# Patient Record
Sex: MALE | Race: WHITE | NOT HISPANIC OR LATINO | Employment: OTHER | URBAN - METROPOLITAN AREA
[De-identification: names, ages, dates, MRNs, and addresses within clinical notes are randomized per-mention and may not be internally consistent; named-entity substitution may affect disease eponyms.]

---

## 2024-09-26 ENCOUNTER — HOSPITAL ENCOUNTER (EMERGENCY)
Facility: CLINIC | Age: 84
Discharge: HOME | End: 2024-09-27
Attending: EMERGENCY MEDICINE | Admitting: EMERGENCY MEDICINE
Payer: COMMERCIAL

## 2024-09-26 DIAGNOSIS — T18.108A FOREIGN BODY IN ESOPHAGUS, INITIAL ENCOUNTER: ICD-10-CM

## 2024-09-26 PROCEDURE — 250N000013 HC RX MED GY IP 250 OP 250 PS 637: Performed by: EMERGENCY MEDICINE

## 2024-09-26 PROCEDURE — 96374 THER/PROPH/DIAG INJ IV PUSH: CPT

## 2024-09-26 PROCEDURE — 99284 EMERGENCY DEPT VISIT MOD MDM: CPT | Mod: 25

## 2024-09-26 PROCEDURE — 96375 TX/PRO/DX INJ NEW DRUG ADDON: CPT

## 2024-09-26 PROCEDURE — 43247 EGD REMOVE FOREIGN BODY: CPT | Performed by: INTERNAL MEDICINE

## 2024-09-26 PROCEDURE — 250N000011 HC RX IP 250 OP 636: Performed by: EMERGENCY MEDICINE

## 2024-09-26 RX ORDER — AZELASTINE HYDROCHLORIDE, FLUTICASONE PROPIONATE 137; 50 UG/1; UG/1
1 SPRAY, METERED NASAL 2 TIMES DAILY
COMMUNITY

## 2024-09-26 RX ORDER — ASPIRIN 81 MG/1
81 TABLET ORAL DAILY
COMMUNITY

## 2024-09-26 RX ORDER — NALOXONE HYDROCHLORIDE 0.4 MG/ML
0.4 INJECTION, SOLUTION INTRAMUSCULAR; INTRAVENOUS; SUBCUTANEOUS
Status: CANCELLED | OUTPATIENT
Start: 2024-09-26

## 2024-09-26 RX ORDER — FLUMAZENIL 0.1 MG/ML
0.2 INJECTION, SOLUTION INTRAVENOUS
Status: CANCELLED | OUTPATIENT
Start: 2024-09-26 | End: 2024-09-27

## 2024-09-26 RX ORDER — NALOXONE HYDROCHLORIDE 0.4 MG/ML
0.2 INJECTION, SOLUTION INTRAMUSCULAR; INTRAVENOUS; SUBCUTANEOUS
Status: CANCELLED | OUTPATIENT
Start: 2024-09-26

## 2024-09-26 RX ORDER — PROCHLORPERAZINE MALEATE 5 MG
5 TABLET ORAL EVERY 6 HOURS PRN
Status: CANCELLED | OUTPATIENT
Start: 2024-09-26

## 2024-09-26 RX ORDER — NITROGLYCERIN 0.4 MG/1
TABLET SUBLINGUAL
Status: COMPLETED
Start: 2024-09-26 | End: 2024-09-26

## 2024-09-26 RX ORDER — NITROGLYCERIN 0.4 MG/1
0.4 TABLET SUBLINGUAL EVERY 5 MIN PRN
COMMUNITY

## 2024-09-26 RX ORDER — LISINOPRIL 40 MG/1
40 TABLET ORAL DAILY
COMMUNITY

## 2024-09-26 RX ORDER — NITROGLYCERIN 0.4 MG/1
0.4 TABLET SUBLINGUAL ONCE
Status: DISCONTINUED | OUTPATIENT
Start: 2024-09-26 | End: 2024-09-27 | Stop reason: HOSPADM

## 2024-09-26 RX ORDER — FENTANYL CITRATE 50 UG/ML
INJECTION, SOLUTION INTRAMUSCULAR; INTRAVENOUS
Status: COMPLETED
Start: 2024-09-26 | End: 2024-09-26

## 2024-09-26 RX ORDER — LIDOCAINE 40 MG/G
CREAM TOPICAL
Status: DISCONTINUED | OUTPATIENT
Start: 2024-09-26 | End: 2024-09-27 | Stop reason: HOSPADM

## 2024-09-26 RX ORDER — ONDANSETRON 2 MG/ML
4 INJECTION INTRAMUSCULAR; INTRAVENOUS EVERY 6 HOURS PRN
Status: CANCELLED | OUTPATIENT
Start: 2024-09-26

## 2024-09-26 RX ORDER — ALLOPURINOL 100 MG/1
100 TABLET ORAL DAILY
COMMUNITY

## 2024-09-26 RX ORDER — OMEPRAZOLE 40 MG/1
40 CAPSULE, DELAYED RELEASE ORAL
Qty: 60 CAPSULE | Refills: 3 | Status: CANCELLED | OUTPATIENT
Start: 2024-09-26

## 2024-09-26 RX ORDER — ONDANSETRON 4 MG/1
4 TABLET, ORALLY DISINTEGRATING ORAL EVERY 6 HOURS PRN
Status: CANCELLED | OUTPATIENT
Start: 2024-09-26

## 2024-09-26 RX ORDER — ATORVASTATIN CALCIUM 40 MG/1
40 TABLET, FILM COATED ORAL DAILY
COMMUNITY

## 2024-09-26 RX ORDER — FENTANYL CITRATE 50 UG/ML
100 INJECTION, SOLUTION INTRAMUSCULAR; INTRAVENOUS
Status: COMPLETED | OUTPATIENT
Start: 2024-09-26 | End: 2024-09-26

## 2024-09-26 RX ADMIN — NITROGLYCERIN 0.4 MG: 0.4 TABLET SUBLINGUAL at 21:39

## 2024-09-26 RX ADMIN — MIDAZOLAM 2 MG: 1 INJECTION INTRAMUSCULAR; INTRAVENOUS at 23:13

## 2024-09-26 RX ADMIN — FENTANYL CITRATE 100 MCG: 50 INJECTION, SOLUTION INTRAMUSCULAR; INTRAVENOUS at 23:12

## 2024-09-26 RX ADMIN — MIDAZOLAM 1 MG: 1 INJECTION INTRAMUSCULAR; INTRAVENOUS at 23:14

## 2024-09-26 ASSESSMENT — COLUMBIA-SUICIDE SEVERITY RATING SCALE - C-SSRS
6. HAVE YOU EVER DONE ANYTHING, STARTED TO DO ANYTHING, OR PREPARED TO DO ANYTHING TO END YOUR LIFE?: NO
1. IN THE PAST MONTH, HAVE YOU WISHED YOU WERE DEAD OR WISHED YOU COULD GO TO SLEEP AND NOT WAKE UP?: NO
2. HAVE YOU ACTUALLY HAD ANY THOUGHTS OF KILLING YOURSELF IN THE PAST MONTH?: NO

## 2024-09-26 ASSESSMENT — ACTIVITIES OF DAILY LIVING (ADL)
ADLS_ACUITY_SCORE: 35

## 2024-09-27 VITALS
HEIGHT: 66 IN | BODY MASS INDEX: 29.55 KG/M2 | OXYGEN SATURATION: 97 % | WEIGHT: 183.86 LBS | TEMPERATURE: 97.1 F | RESPIRATION RATE: 17 BRPM | SYSTOLIC BLOOD PRESSURE: 161 MMHG | HEART RATE: 73 BPM | DIASTOLIC BLOOD PRESSURE: 87 MMHG

## 2024-09-27 LAB — UPPER GI ENDOSCOPY: NORMAL

## 2024-09-27 NOTE — ED TRIAGE NOTES
Pt reports esophageal stricture and reports at 1820 got a piece of pork tenderloin stuck. Pt spitting out his secretions

## 2024-09-27 NOTE — DISCHARGE INSTRUCTIONS
Be sure to chew your food thoroughly before swallowing.    Follow-up with [facility] to discuss neck steps.  They should have sent a prescription for antacid medication to the pharmacy for you.    Return to the ER if you develop any new or troubling symptoms.    The patient has received a copy of the Provation Report the doctor has written and was discussed with the patient and given to primary RN prior to returning to the inpatient floor.  All questions were answered and patient has provider's office phone number for further questions or concerns.     Discharge Instructions  Procedural Sedation    During your visit today you had Procedural Sedation which is the process used to give you medications in order to make you comfortable or relaxed while a painful or difficult procedure is performed. This might have been a wound repair, fracture reduction ( setting a broken bone ), relocation of a dislocated joint, or other procedure.    The medications used for Procedural Sedation are generally very short-acting so you are being discharged at a time when it is most likely that the medications have completely left your body. It is possible that the medication could cause some persistent symptoms like nausea, drowsiness, dizziness, clumsiness/poor balance, or poor judgment. As a result, please do not drive, operate machinery/tools, or do anything else that requires judgment or coordination for the rest of the day. This could include climbing ladders or other heights, swimming/bathing, exercising, bicycling, or other similar activities. If you are feeling back to normal, you may resume normal activities the following day.    Generally, every Emergency Department visit should have a follow-up clinic visit with either a primary or a specialty clinic/provider. Please follow-up as instructed by your emergency provider today.  Return to the Emergency Department right away if:  You have difficulty breathing.  Your friends or  family feel that you are too sleepy/sedated.  You have repeated vomiting.    Home care:  Do not drink alcohol or take sedating (sleeping) medications.  Take pain medications only as instructed by your provider.  Begin with a light diet (like clear liquids) and add more foods as your stomach tolerates.  If you have vomiting, return to clear liquids.    Follow-up:  Follow-up was recommended based on the condition that you received Procedural Sedation for; follow-up according to the instructions you received from your provider today.      If you were given a prescription for medicine here today, be sure to read all of the information (including the package insert) that comes with your prescription. This will include important information about the medicine, its side effects, and any warnings that you need to know about. The pharmacist who fills the prescription can provide more information and answer questions you may have about the medicine.  If you have questions or concerns that the pharmacist cannot address, please call or return to the Emergency Department.     Remember that you can always come back to the Emergency Department if you are not able to see your regular provider in the amount of time listed above, if you get any new symptoms, or if there is anything that worries you.

## 2024-09-27 NOTE — ED NOTES
Patient tolerated water. Tolerated 7up. Able to walk around room without nausea and dizziness.   Verbalized understanding of discharge instructions.

## 2024-09-27 NOTE — ED PROVIDER NOTES
Emergency Department Note      History of Present Illness     Chief Complaint   Esophageal foreign body    HPI   Zane Coleman is a 83 year old male who presents to the ED with his wife for evaluation of an esophageal foreign body.  (Note made of  listing airway obstruction, which is inaccurate).  Patient reports a history of known esophageal stricture, diagnosed on EGD about 10 years previous.  Lately, he has noted increased feelings of food being stuck as he swallows, though has not had complete obstruction.  Earlier this evening, he was eating dinner when a piece of pork lodged in his mid to lower esophagus.  He has tried gargling warm water and pounding on his chest, which has not alleviated his obstruction.  He denies any other food products that may have gotten stuck, including sharp objects or bones.  He presents to the ED for further assessment.    Independent Historian   Wife as detailed above.    Review of External Notes   I reviewed an urgency room note from 08/31 where he was seen for a knee effusion and right leg wound.    Past Medical History     Medical History and Problem List   Colon polyps   CAD   Dysphagia   Gout   Hypertension   Hyperlipidemia   Internal hemorrhoids   Malig Neopl Skin Trunk   Osteoarthritis   Obstructive sleep apnea   Type 2 diabetes     Medications   No current outpatient medications on file.    Surgical History   Total hip arthroplasty   Patellectomy   Arthroplasty, bilateral knees  Septoplasty   Hip replacement   Colonoscopy x2  EGD surgery     Physical Exam     Patient Vitals for the past 24 hrs:   BP Temp Temp src Pulse Resp SpO2 Height Weight   09/26/24 2345 126/72 -- -- 74 14 95 % -- --   09/26/24 2340 123/71 -- -- 77 17 97 % -- --   09/26/24 2335 126/70 -- -- 77 15 95 % -- --   09/26/24 2330 (!) 140/75 -- -- 80 16 95 % -- --   09/26/24 2325 (!) 147/79 -- -- 81 16 98 % -- --   09/26/24 2320 (!) 178/94 -- -- 84 14 93 % -- --   09/26/24 2315 (!) 147/82 -- -- 90 15 97 %  "-- --   09/26/24 2310 (!) 178/94 -- -- 91 12 99 % -- --   09/26/24 2305 (!) 183/106 -- -- 88 11 97 % -- --   09/26/24 2301 (!) 182/93 -- -- 93 15 94 % -- --   09/26/24 2215 (!) 178/87 -- -- 88 -- -- -- --   09/26/24 2200 (!) 171/86 -- -- 93 -- -- -- --   09/26/24 2155 (!) 155/82 -- -- 96 -- -- -- --   09/26/24 2150 (!) 167/84 -- -- 93 -- -- -- --   09/26/24 2145 (!) 172/116 -- -- 99 -- -- -- --   09/26/24 2140 (!) 180/96 -- -- 87 -- 100 % -- --   09/26/24 2138 (!) 189/95 -- -- 92 -- -- -- --   09/26/24 2100 (!) 141/72 -- -- 85 -- 98 % -- --   09/26/24 2030 (!) 152/78 -- -- 86 -- 98 % -- --   09/26/24 2029 -- -- -- -- -- 99 % -- --   09/26/24 2027 (!) 167/79 -- -- 94 -- -- -- --   09/26/24 2021 (!) 174/111 97.1  F (36.2  C) Temporal 67 18 99 % 1.676 m (5' 6\") 83.4 kg (183 lb 13.8 oz)     Physical Exam  General:              Well-nourished              Speaking in full sentences              Intermittently spitting out saliva  Eyes:              Conjunctiva without injection or scleral icterus  ENT:              Moist mucous membranes              Nares patent              Pinnae normal  Neck:              Full ROM              No stiffness appreciated              No stridor  Resp:              Lungs CTAB              No crackles, wheezing or audible rubs              Good air movement  CV:                    Normal rate, regular rhythm              S1 and S2 present              No murmur, gallop or rub  GI:              BS present              Abdomen soft without distention              Non-tender to light and deep palpation              No guarding or rebound tenderness  Skin:              Warm, dry, well perfused              No rashes or open wounds on exposed skin  MSK:              Moves all extremities              No focal deformities or swelling  Neuro:              Alert              Answers questions appropriately              Moves all extremities equally              Gait stable  Psych:              " Normal affect, normal mood    Diagnostics     Lab Results   Labs Ordered and Resulted from Time of ED Arrival to Time of ED Departure - No data to display    Imaging   No orders to display     Independent Interpretation   None    ED Course      Medications Administered   Medications   nitroGLYcerin (NITROSTAT) sublingual tablet 0.4 mg ( Sublingual Canceled Entry 9/26/24 2140)   sodium chloride 0.9% BOLUS 500 mL (has no administration in time range)   lidocaine 1 % 0.1-1 mL (has no administration in time range)   lidocaine (LMX4) cream (has no administration in time range)   sodium chloride (PF) 0.9% PF flush 3 mL (has no administration in time range)   sodium chloride (PF) 0.9% PF flush 3 mL (has no administration in time range)   nitroGLYcerin (NITROSTAT) 0.4 MG sublingual tablet (0.4 mg  $Given 9/26/24 2139)   fentaNYL (PF) (SUBLIMAZE) injection 100 mcg (100 mcg Intravenous $Given 9/26/24 2312)   midazolam (VERSED) injection 2 mg (1 mg Intravenous $Given 9/26/24 2314)       Procedures   Procedures     Discussion of Management   Gastroenterology, Dr. Tang    ED Course   ED Course as of 09/26/24 2354   Thu Sep 26, 2024   2047 I obtained history and examined the patient as noted above.    2133 I rechecked and updated the patient.    2153 I spoke to Dr. Tang from [facility] regarding the patient's current status.   2206 I rechecked and updated the patient.    2345 Patient re-evaluated     Additional Documentation  None    Medical Decision Making / Diagnosis     CMS Diagnoses: None    MIPS       None    MDM   Zane Coleman is a 83 year old male presenting to the ER for evaluation of an esophageal foreign body.  VS on presentation reveal elevated BP, which improved on recheck.  History consistent with esophageal food bolus.  We initially attempted EZ gas and sublingual nitroglycerin, though without any alleviation in symptoms.  GI consulted and have graciously evaluated patient in the ED and performed EGD.  They have  noted findings of Schatzki's ring, and were able to pass the food bolus.  They have sent a prescription for a PPI to the pharmacy.  Patient was allowed to awaken from sedation and will be ambulated in the ED.  Will plan discharge home and with close GI follow-up as they do anticipate need for dilation of the esophageal stricture in the coming weeks.  Plan of care discussed with family.  Return to ER with any new or troubling symptoms.  Ensure adequate chewing of food prior to swallowing.  Questions answered prior to discharge.    Disposition   The patient was discharged.     Diagnosis     ICD-10-CM    1. Foreign body in esophagus, initial encounter  T18.108A              Laura GRAY, coral serving as a scribe at 8:45 PM on 9/26/2024 to document services personally performed by Cam Barrow MD based on my observations and the provider's statements to me.        Cam Barrow MD  09/26/24 7101

## 2024-10-24 ENCOUNTER — APPOINTMENT (OUTPATIENT)
Dept: GENERAL RADIOLOGY | Facility: CLINIC | Age: 84
End: 2024-10-24
Attending: STUDENT IN AN ORGANIZED HEALTH CARE EDUCATION/TRAINING PROGRAM
Payer: COMMERCIAL

## 2024-10-24 ENCOUNTER — HOSPITAL ENCOUNTER (EMERGENCY)
Facility: CLINIC | Age: 84
Discharge: HOME | End: 2024-10-24
Attending: STUDENT IN AN ORGANIZED HEALTH CARE EDUCATION/TRAINING PROGRAM | Admitting: STUDENT IN AN ORGANIZED HEALTH CARE EDUCATION/TRAINING PROGRAM
Payer: COMMERCIAL

## 2024-10-24 VITALS
DIASTOLIC BLOOD PRESSURE: 80 MMHG | RESPIRATION RATE: 20 BRPM | TEMPERATURE: 97.5 F | SYSTOLIC BLOOD PRESSURE: 158 MMHG | HEART RATE: 92 BPM | HEIGHT: 66 IN | WEIGHT: 182.5 LBS | OXYGEN SATURATION: 99 % | BODY MASS INDEX: 29.33 KG/M2

## 2024-10-24 DIAGNOSIS — M25.432 WRIST SWELLING, LEFT: ICD-10-CM

## 2024-10-24 LAB
ALBUMIN SERPL BCG-MCNC: 3.7 G/DL (ref 3.5–5.2)
ALP SERPL-CCNC: 108 U/L (ref 40–150)
ALT SERPL W P-5'-P-CCNC: 18 U/L (ref 0–70)
ANION GAP SERPL CALCULATED.3IONS-SCNC: 14 MMOL/L (ref 7–15)
AST SERPL W P-5'-P-CCNC: 29 U/L (ref 0–45)
BASOPHILS # BLD AUTO: 0 10E3/UL (ref 0–0.2)
BASOPHILS NFR BLD AUTO: 0 %
BILIRUB SERPL-MCNC: 0.8 MG/DL
BUN SERPL-MCNC: 16.6 MG/DL (ref 8–23)
CALCIUM SERPL-MCNC: 9.1 MG/DL (ref 8.8–10.4)
CHLORIDE SERPL-SCNC: 103 MMOL/L (ref 98–107)
CREAT SERPL-MCNC: 0.94 MG/DL (ref 0.67–1.17)
CRP SERPL-MCNC: 10.72 MG/L
EGFRCR SERPLBLD CKD-EPI 2021: 80 ML/MIN/1.73M2
EOSINOPHIL # BLD AUTO: 0.1 10E3/UL (ref 0–0.7)
EOSINOPHIL NFR BLD AUTO: 1 %
ERYTHROCYTE [DISTWIDTH] IN BLOOD BY AUTOMATED COUNT: 13.1 % (ref 10–15)
ERYTHROCYTE [SEDIMENTATION RATE] IN BLOOD BY WESTERGREN METHOD: 27 MM/HR (ref 0–20)
GLUCOSE SERPL-MCNC: 126 MG/DL (ref 70–99)
HCO3 SERPL-SCNC: 21 MMOL/L (ref 22–29)
HCT VFR BLD AUTO: 40.5 % (ref 40–53)
HGB BLD-MCNC: 13.7 G/DL (ref 13.3–17.7)
HOLD SPECIMEN: NORMAL
HOLD SPECIMEN: NORMAL
IMM GRANULOCYTES # BLD: 0 10E3/UL
IMM GRANULOCYTES NFR BLD: 0 %
LYMPHOCYTES # BLD AUTO: 1.6 10E3/UL (ref 0.8–5.3)
LYMPHOCYTES NFR BLD AUTO: 22 %
MCH RBC QN AUTO: 30.7 PG (ref 26.5–33)
MCHC RBC AUTO-ENTMCNC: 33.8 G/DL (ref 31.5–36.5)
MCV RBC AUTO: 91 FL (ref 78–100)
MONOCYTES # BLD AUTO: 0.6 10E3/UL (ref 0–1.3)
MONOCYTES NFR BLD AUTO: 8 %
NEUTROPHILS # BLD AUTO: 5 10E3/UL (ref 1.6–8.3)
NEUTROPHILS NFR BLD AUTO: 68 %
NRBC # BLD AUTO: 0 10E3/UL
NRBC BLD AUTO-RTO: 0 /100
PLATELET # BLD AUTO: 191 10E3/UL (ref 150–450)
POTASSIUM SERPL-SCNC: 4.6 MMOL/L (ref 3.4–5.3)
PROT SERPL-MCNC: 6.8 G/DL (ref 6.4–8.3)
RBC # BLD AUTO: 4.46 10E6/UL (ref 4.4–5.9)
SODIUM SERPL-SCNC: 138 MMOL/L (ref 135–145)
WBC # BLD AUTO: 7.4 10E3/UL (ref 4–11)

## 2024-10-24 PROCEDURE — 85652 RBC SED RATE AUTOMATED: CPT | Performed by: STUDENT IN AN ORGANIZED HEALTH CARE EDUCATION/TRAINING PROGRAM

## 2024-10-24 PROCEDURE — 85025 COMPLETE CBC W/AUTO DIFF WBC: CPT | Performed by: STUDENT IN AN ORGANIZED HEALTH CARE EDUCATION/TRAINING PROGRAM

## 2024-10-24 PROCEDURE — 87205 SMEAR GRAM STAIN: CPT | Performed by: STUDENT IN AN ORGANIZED HEALTH CARE EDUCATION/TRAINING PROGRAM

## 2024-10-24 PROCEDURE — 36415 COLL VENOUS BLD VENIPUNCTURE: CPT | Performed by: STUDENT IN AN ORGANIZED HEALTH CARE EDUCATION/TRAINING PROGRAM

## 2024-10-24 PROCEDURE — 86140 C-REACTIVE PROTEIN: CPT | Performed by: STUDENT IN AN ORGANIZED HEALTH CARE EDUCATION/TRAINING PROGRAM

## 2024-10-24 PROCEDURE — 20605 DRAIN/INJ JOINT/BURSA W/O US: CPT

## 2024-10-24 PROCEDURE — 73130 X-RAY EXAM OF HAND: CPT | Mod: LT

## 2024-10-24 PROCEDURE — 99284 EMERGENCY DEPT VISIT MOD MDM: CPT | Mod: 25

## 2024-10-24 PROCEDURE — 80053 COMPREHEN METABOLIC PANEL: CPT | Performed by: STUDENT IN AN ORGANIZED HEALTH CARE EDUCATION/TRAINING PROGRAM

## 2024-10-24 PROCEDURE — 73110 X-RAY EXAM OF WRIST: CPT | Mod: LT

## 2024-10-24 RX ORDER — CEPHALEXIN 500 MG/1
500 CAPSULE ORAL 4 TIMES DAILY
Qty: 28 CAPSULE | Refills: 0 | Status: SHIPPED | OUTPATIENT
Start: 2024-10-24 | End: 2024-10-31

## 2024-10-24 ASSESSMENT — COLUMBIA-SUICIDE SEVERITY RATING SCALE - C-SSRS
2. HAVE YOU ACTUALLY HAD ANY THOUGHTS OF KILLING YOURSELF IN THE PAST MONTH?: NO
1. IN THE PAST MONTH, HAVE YOU WISHED YOU WERE DEAD OR WISHED YOU COULD GO TO SLEEP AND NOT WAKE UP?: NO
6. HAVE YOU EVER DONE ANYTHING, STARTED TO DO ANYTHING, OR PREPARED TO DO ANYTHING TO END YOUR LIFE?: NO

## 2024-10-24 ASSESSMENT — ACTIVITIES OF DAILY LIVING (ADL)
ADLS_ACUITY_SCORE: 0
ADLS_ACUITY_SCORE: 0

## 2024-10-24 NOTE — ED PROVIDER NOTES
"ED APC SUPERVISION NOTE:   I evaluated this patient in conjunction with *** {NP/PA:442265}  I have participated in the care of the patient and personally performed key elements of the history, exam, and medical decision making.      HPI:   Zane Coleman is a 83 year old male ***      Independent Historian:   {SASHA Independent Historian:581332::\"None\"}    Review of External Notes: ***      EXAM:   ***    Independent Interpretation (X-rays, CTs, rhythm strip):  {IndependentReview:929862::\"None\"}    Consultations/Discussion of Management or Tests:  {Consults/Care Discussions:220392::\"None\"}     MEDICAL DECISION MAKING/ASSESSMENT AND PLAN:   ***      DIAGNOSIS:   No diagnosis found.      {Provider or scribe signature:[id number]}  10/24/2024  [facility] EMERGENCY DEPT  "

## 2024-10-24 NOTE — ED PROVIDER NOTES
ED APC SUPERVISION NOTE:   I evaluated this patient in conjunction with Mary Cruz PA-C  I have participated in the care of the patient and personally performed key elements of the history, exam, and medical decision making.      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  He presents with swelling and pain of the left wrist.  He does do quite a bit of work with his hands and has been doing work lately with the potential strain.  However, he is developed some erythema of the overlying skin.  No fevers, chills, or nausea.  He feels that the pain is different from his typical gout symptoms.  He does have a history of diabetes and is on metformin but says his blood sugars are generally well-controlled.  He has never had a septic arthritis in the past.    Review of External Notes:   Orthopedic notes reviewed from September 26, 2016 and the patient was noted to have osteoarthritis.  At that point he had reverse total shoulder arthroplasty on the left.     EXAM:   General: No distress  Musculoskeletal: There is an effusion of the left wrist.  There is erythema of the dorsum of the wrist.  Full active range of motion.  There is mild pain elicited with passive range of motion.  There is ecchymosis over the dorsum of the left hand especially overlying the fourth and fifth metacarpals.  There is a chronic deformity of the right index left index finger.  No erythema or tenderness of the finger.    Independent Interpretation (X-rays, CTs, rhythm strip):  X-ray left wrist independently interpreted.  No fracture.       MEDICAL  DECISION MAKING/ASSESSMENT AND PLAN:   This patient presents with erythema over the dorsum of the left wrist and hand.  Broad differential diagnosis is considered including gout, pseudogout, trauma, arthritis, cellulitis.  There are some ecchymosis although the patient feels that there was no direct injury.  This could be an effusion related to straining of the wrist.  Gout is certainly a possibility given the patient's history although he feels that this pain seems different and he is never had in the wrist before.  He is diabetic and we considered the possibility of an infection.  The patient was offered an arthrocentesis which he feels he would prefer at this point.    Arthrocentesis was successful.  There is minimal fluid present and lab follow-up there was no enough for culture which is pending.  It is still possible this is gout and the patient will follow closely with orthopedics.  Given the possibility of cellulitis overlying the joint we will cover him with Keflex.  The fluid was clear and at this point septic arthritis seems unlikely but I stated we will follow culture results.     DIAGNOSIS:     ICD-10-CM    1. Wrist swelling, left  M25.432            Oh Doyle MD  10/24/24 2159

## 2024-10-24 NOTE — ED TRIAGE NOTES
Pt here for L hand/wrist pain, redness, and swelling. Hx of prosthetic joint in pointer finger. Pain is worse at night. Sent here from  for further workup.

## 2024-10-24 NOTE — ED PROVIDER NOTES
"  Emergency Department Note      History of Present Illness     Chief Complaint   Hand Pain    HPI   Zane Coleman is a 83 year old male with history including osteoarthritis, prosthetic finger joints, and type 2 diabetes mellitus who presents to the ED for evaluation of hand pain. The patient reports having difficulties sleeping 2 night ago after waking up with left wrist pain. The pain was gone in the morning, but returned last night. He took advil, which helped to alleviate the pain. Today, he's had increased pain, redness, and swelling in his left wrist extending into his hand. The pain and swelling makes it difficult to use his hand. The pain doesn't feel like gout. He has never had an infection in his hand before. He notes having a right lower extremity infection at the end of August after scraping his shin on a wooden pallet. He was prescribed keflex and his symptoms resolved.     Independent Historian   None    Review of External Notes   Reviewed urgent care note from earlier today    Past Medical History     Medical History and Problem List   Colon polyps   CAD   Dysphagia   Gout   Hypertension   Hyperlipidemia   Internal hemorrhoids   Malig Neopl Skin Trunk   Osteoarthritis   Obstructive sleep apnea   Type 2 diabetes     Medications   Allopurinol  Aspirin 81 mg  Atorvastatin  Lisinopril  Metformin  Nitroglycerin    Surgical History   EGD    Physical Exam     Patient Vitals for the past 24 hrs:   BP Temp Temp src Pulse Resp SpO2 Height Weight   10/24/24 1610 (!) 158/80 -- -- -- -- -- -- --   10/24/24 1609 -- 97.5  F (36.4  C) Temporal 92 20 99 % 1.676 m (5' 6\") 82.8 kg (182 lb 8 oz)     Physical Exam  General: Alert and cooperative with exam. Patient in no apparent distress. Normal mentation.  Head:  Scalp is NC/AT  Eyes:  EOM intact  ENT:  The external nose and ears are normal.   Neck:  Normal range of motion without rigidity.  CV:  Warm and well perfused  Resp:  Breathing comfortably on room air  MSK: "   Effusion noted to left wrist.  Warmth, erythema, and tenderness over the dorsal aspect of the left wrist.  Ecchymosis over dorsal aspect of fifth and fourth metacarpals with mild tenderness to palpation.  No obvious deformity present in this area.  Chronic deformity noted to digits.  No erythema, ecchymosis, or swelling noted to digits.  Normal range of motion of the wrist however this does elicit some pain.  Skin:  Warm and dry, No rash or lesions noted.  Neuro:  Oriented x 3. No gross motor deficits.      Diagnostics     Lab Results   Labs Ordered and Resulted from Time of ED Arrival to Time of ED Departure   COMPREHENSIVE METABOLIC PANEL - Abnormal       Result Value    Sodium 138      Potassium 4.6      Carbon Dioxide (CO2) 21 (*)     Anion Gap 14      Urea Nitrogen 16.6      Creatinine 0.94      GFR Estimate 80      Calcium 9.1      Chloride 103      Glucose 126 (*)     Alkaline Phosphatase 108      AST 29      ALT 18      Protein Total 6.8      Albumin 3.7      Bilirubin Total 0.8     CRP INFLAMMATION - Abnormal    CRP Inflammation 10.72 (*)    CBC WITH PLATELETS AND DIFFERENTIAL    WBC Count 7.4      RBC Count 4.46      Hemoglobin 13.7      Hematocrit 40.5      MCV 91      MCH 30.7      MCHC 33.8      RDW 13.1      Platelet Count 191      % Neutrophils 68      % Lymphocytes 22      % Monocytes 8      % Eosinophils 1      % Basophils 0      % Immature Granulocytes 0      NRBCs per 100 WBC 0      Absolute Neutrophils 5.0      Absolute Lymphocytes 1.6      Absolute Monocytes 0.6      Absolute Eosinophils 0.1      Absolute Basophils 0.0      Absolute Immature Granulocytes 0.0      Absolute NRBCs 0.0     ERYTHROCYTE SEDIMENTATION RATE AUTO   CRYSTAL ID SYNOVIAL FLUID   CELL COUNT BODY FLUID   DIFERENTIAL BODY FLUID   AEROBIC BACTERIAL CULTURE ROUTINE   CELL COUNT WITH DIFFERENTIAL FLUID     Imaging   XR Wrist Left G/E 3 Views   Final Result   IMPRESSION:       No acute, displaced left wrist or hand fracture or  dislocation. There are postoperative changes from index finger PIP joint arthroplasty. There is osseous fusion across the second and third and fifth DIP joints.      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 There is arthropathy involving the MCP and interphalangeal joints with joint space narrowing and marginal osteophyte formation most pronounced at the third PIP    joint where it appears advanced.      XR Hand Left G/E 3 Views   Final Result   IMPRESSION:       No acute, displaced left wrist or hand fracture or dislocation. There are postoperative changes from index finger PIP joint arthroplasty. There is osseous fusion across the second and third and fifth DIP joints.      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 There is arthropathy involving the MCP and interphalangeal joints with joint space narrowing and marginal osteophyte formation most pronounced at the third PIP    joint where it appears advanced.          Independent Interpretation   X-rays of the left wrist and left hand without any evidence of acute fracture or dislocation    ED Course      Medications Administered   Medications   lidocaine 1 % 5 mL (has no administration in time range)       Procedures   See Zane Diamond PA-C's separate procedural note    Discussion of Management   See ED Course below    ED Course   ED Course as of 10/24/24 1954   Thu Oct 24, 2024   1732 I obtained history and examined the patient as noted above.    1848 I spoke with Oh Doyle MD.        Additional Documentation  None    Medical Decision Making / Diagnosis     CMS Diagnoses: None    MIPS       None    [facility]   Zane MARLIN Jared is a 83 year old male presents with atraumatic left wrist and hand pain.  History of gout however states that symptoms are not consistent with previous gout flares.  On exam, swelling, warmth, and erythema noted to dorsal aspect of left  wrist with ecchymosis also noted over fifth metacarpal.  He has full range of motion of the wrist without difficulty but does endorse mild pain with this.  Normal range of motion of the digits with no tenderness to palpation throughout.  X-rays were obtained of the hand and wrist and are without any evidence of fracture, dislocation, or signs of osteomyelitis.  Due to concern for possible septic joint vs gout, arthrocentesis was completed at the bedside by my colleague, Zane Diamond PA-C.  See his separate procedural note.  Patient tolerated this well.  Will send synovial fluid for culture as well as further testing.  In the meantime, treat for presumed cellulitis with course of Keflex.  His labs here are reassuring however his CRP was noted to be mildly elevated at 10.72.  If synovial fluid testing returns with indications for treatment change, patient will be called to be informed of this.  Reasons to return to ER discussed.  I provided contact information for hand specialist with [facility] for further evaluation and to ensure symptoms are resolving.     Disposition   The patient was discharged.     Diagnosis     ICD-10-CM    1. Wrist swelling, left  M25.432            Discharge Medications   New Prescriptions    CEPHALEXIN (KEFLEX) 500 MG CAPSULE    Take 1 capsule (500 mg) by mouth 4 times daily for 7 days.     Scribe Disclosure:  I, Christopher Horn, am serving as a scribe at 5:29 PM on 10/24/2024 to document services personally performed by Mary Cruz PA-C, based on my observations and the provider's statements to me.        Mary Cruz PA-C  10/24/24 1954

## 2024-10-25 ENCOUNTER — TELEPHONE (OUTPATIENT)
Dept: NURSING | Facility: CLINIC | Age: 84
End: 2024-10-25
Payer: COMMERCIAL

## 2024-10-25 NOTE — TELEPHONE ENCOUNTER
[facility]    Reason for call: Lab Result Notification     Lab Result (including Rx patient on, if applicable).  If culture, copy of lab report at bottom.  Lab Result:   Component      Latest Ref Rng 10/24/2024  4:32 PM   Sed Rate      0 - 20 mm/hr 27 (H)       Legend:  (H) High    Creatinine Level (mg/dl)   Creatinine   Date Value Ref Range Status   10/24/2024 0.94 0.67 - 1.17 mg/dL Final    Creatinine clearance (ml/min), if applicable    Serum creatinine: 0.94 mg/dL 10/24/24 1632  Estimated creatinine clearance: 60.1 mL/min     Patient's current Symptoms:   Unable to assess, left message.  Seen in the ED for left wrist swelling  RN Recommendations/Instructions per [facility] ED lab result protocol:   [facility] ED lab result protocol utilized: glen Arias, RN

## 2024-10-25 NOTE — TELEPHONE ENCOUNTER
Zane returned my call and reports left wrist is less painful, mild pain with movement. He continues to have redness  and swelling but it is not getting worse. Agreed to report lab results to O when he sees them for an appt.

## 2024-10-25 NOTE — DISCHARGE INSTRUCTIONS
We will call you to inform you if there are crystals or bacteria present within the fluid.  For now, lets treat for presumed infection.  I have sent antibiotics to your pharmacy, please take as prescribed.  Please also follow-up with hand specialist.  I provided contact information for Dr. Megan Nelson follow-up with for reassessment.  If you develop worsening symptoms such as fever, worsening hand pain or redness, or other acute concerns, return to ER.  In the meantime, continue ibuprofen for ongoing pain.

## 2024-10-25 NOTE — LETTER
October 25, 2024        Zane Coleman  [id number] [facility] [id number]          Dear Zane Coleman:    You were seen in the [facility] at [facility] on 10/24/2024.  We are unable to reach you by phone, so we are sending you this letter.     It is important that you call [facility] lab result nurse at [phone], as we have information to relay to you AND/OR we MAY have to make some changes in your treatment.    Best time to call back is between 9AM and 5:30PM, 7 days a week.      Sincerely,     [facility] Lab Result RN  [phone]

## 2024-10-25 NOTE — ED NOTES
Arthrocentesis     Procedure: Arthrocentesis    Indication: Joint Pain, Erythema, and Joint Swelling    Consent: Written from Patient    Universal Protocol: Universal protocol was followed and time out conducted just prior to starting procedure, confirming patient identity, site/side, procedure, patient position, and availability of correct equipment and implants.     Location: Left Wrist    Preparation: Chlorhexidine    Anesthesia/Sedation: Lidocaine - 1%    Procedure Detail: The site was prepped and draped in the usual fashion.  A 18 gauge needle was used via the medial approach.  0.3mL appearing joint fluid was withdrawn. The fluid was clear.       Patient Status: The patient tolerated the procedure well: Yes. There were no complications.     Zane Diamond PA-C   Supervised by Zane Thompson PA-C  10/24/24 [id number]

## 2024-10-29 LAB
BACTERIA SNV CULT: NO GROWTH
GRAM STAIN RESULT: NORMAL
GRAM STAIN RESULT: NORMAL